# Patient Record
Sex: MALE | Race: OTHER | URBAN - METROPOLITAN AREA
[De-identification: names, ages, dates, MRNs, and addresses within clinical notes are randomized per-mention and may not be internally consistent; named-entity substitution may affect disease eponyms.]

---

## 2018-09-08 ENCOUNTER — EMERGENCY (EMERGENCY)
Facility: HOSPITAL | Age: 23
LOS: 0 days | Discharge: HOME | End: 2018-09-08
Attending: STUDENT IN AN ORGANIZED HEALTH CARE EDUCATION/TRAINING PROGRAM
Payer: COMMERCIAL

## 2018-09-08 VITALS
DIASTOLIC BLOOD PRESSURE: 83 MMHG | WEIGHT: 199.96 LBS | SYSTOLIC BLOOD PRESSURE: 149 MMHG | TEMPERATURE: 98 F | OXYGEN SATURATION: 100 % | RESPIRATION RATE: 16 BRPM | HEART RATE: 93 BPM

## 2018-09-08 DIAGNOSIS — S61.214A LACERATION WITHOUT FOREIGN BODY OF RIGHT RING FINGER WITHOUT DAMAGE TO NAIL, INITIAL ENCOUNTER: ICD-10-CM
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
## 2018-09-08 PROCEDURE — 99282 EMERGENCY DEPT VISIT SF MDM: CPT

## 2018-09-08 RX ORDER — TETANUS TOXOID, REDUCED DIPHTHERIA TOXOID AND ACELLULAR PERTUSSIS VACCINE, ADSORBED 5; 2.5; 8; 8; 2.5 [IU]/.5ML; [IU]/.5ML; UG/.5ML; UG/.5ML; UG/.5ML
0.5 SUSPENSION INTRAMUSCULAR ONCE
Qty: 0 | Refills: 0 | Status: COMPLETED | OUTPATIENT
Start: 2018-09-08 | End: 2018-09-08

## 2018-09-08 RX ADMIN — TETANUS TOXOID, REDUCED DIPHTHERIA TOXOID AND ACELLULAR PERTUSSIS VACCINE, ADSORBED 0.5 MILLILITER(S): 5; 2.5; 8; 8; 2.5 SUSPENSION INTRAMUSCULAR at 12:27

## 2018-09-08 NOTE — ED ADULT TRIAGE NOTE - CHIEF COMPLAINT QUOTE
partial avulsion 4th finger tip rt hand partial avulsion 4th finger tip rt hand .9ns dsg applied + bleeding

## 2018-09-08 NOTE — ED ADULT NURSE NOTE - NSIMPLEMENTINTERV_GEN_ALL_ED
Implemented All Universal Safety Interventions:  [place] to call system. Call bell, personal items and telephone within reach. Instruct patient to call for assistance. Room bathroom lighting operational. Non-slip footwear when patient is off stretcher. Physically safe environment: no spills, clutter or unnecessary equipment. Stretcher in lowest position, wheels locked, appropriate side rails in place.

## 2018-09-08 NOTE — ED PROVIDER NOTE - MEDICAL DECISION MAKING DETAILS
pt's wound was cleaned, surgicel applied to the area which was very small and covered with a 2x2 and wrapped

## 2018-09-08 NOTE — ED PROVIDER NOTE - OBJECTIVE STATEMENT
23 year old male presents today c/o cutting the tip of his right fourth digit with a razor blade today, last tetanus unknown, (-) paresthesia +normal rom of the finger , cap refill <2 sec

## 2018-09-08 NOTE — ED ADULT NURSE NOTE - OBJECTIVE STATEMENT
received pt to  FT area. states he was cutting something with a razor blade and accidentally cut his finger. partial avulsion 4th finger tip rt hand .9ns dsg applied + bleeding

## 2018-11-08 ENCOUNTER — EMERGENCY (EMERGENCY)
Facility: HOSPITAL | Age: 23
LOS: 1 days | Discharge: HOME | End: 2018-11-08
Attending: EMERGENCY MEDICINE
Payer: COMMERCIAL

## 2018-11-08 VITALS
TEMPERATURE: 99 F | HEART RATE: 83 BPM | DIASTOLIC BLOOD PRESSURE: 81 MMHG | SYSTOLIC BLOOD PRESSURE: 148 MMHG | OXYGEN SATURATION: 99 % | RESPIRATION RATE: 16 BRPM | HEIGHT: 72 IN | WEIGHT: 210.1 LBS

## 2018-11-08 PROCEDURE — 99283 EMERGENCY DEPT VISIT LOW MDM: CPT | Mod: 25

## 2018-11-08 PROCEDURE — 12032 INTMD RPR S/A/T/EXT 2.6-7.5: CPT

## 2018-11-08 PROCEDURE — 73090 X-RAY EXAM OF FOREARM: CPT

## 2018-11-08 PROCEDURE — 73090 X-RAY EXAM OF FOREARM: CPT | Mod: 26,LT

## 2018-11-08 RX ORDER — ACETAMINOPHEN 500 MG
975 TABLET ORAL ONCE
Qty: 0 | Refills: 0 | Status: COMPLETED | OUTPATIENT
Start: 2018-11-08 | End: 2018-11-08

## 2018-11-08 RX ADMIN — Medication 975 MILLIGRAM(S): at 09:01

## 2018-11-08 NOTE — ED PROVIDER NOTE - OBJECTIVE STATEMENT
23y M no signif PMH here with ~6cm laceration to volar forearm. Pt states was pushing up a glass window and elbow went thru window. Occurred ~1-2 hours ago. On arrival pain 7/10. Has not taken anything for pain. No active bleeding. No weakness, numbness. Last Tdap 9/8/18. Denies SI/HI.

## 2018-11-08 NOTE — ED ADULT TRIAGE NOTE - CHIEF COMPLAINT QUOTE
"I was trying to open a window that was stuck. My arm went right through it"  Laceration to right forearm, pt reports deep lac. Abrasions to right 2nd & 3rd digits. Pt reports last TDAP while oversees ~6 years ago

## 2018-11-08 NOTE — ED PROVIDER NOTE - MEDICAL DECISION MAKING DETAILS
23y M no signif PMH here with ~6cm laceration to volar forearm and couple additional smaller lacs. Tdap UTD. Perip NV intact. Will 23y M no signif PMH here with ~6cm laceration to volar forearm and couple additional smaller lacs. Tdap UTD. Perip NV intact. Will irrigate, remove glass, Xray and repair.

## 2018-11-08 NOTE — ED PROCEDURE NOTE - PROCEDURE ADDITIONAL DETAILS
4 lacerations to right forearm - the largest measuring 6cm. The others ranging 1.5-2cm. Total of 24 sutures placed, with application of dermabond in one superficial laceration and steristrip placement in additional superficial wound.

## 2018-11-08 NOTE — ED PROVIDER NOTE - PHYSICAL EXAMINATION
Gen: WNWD NAD  HEENT: NCAT   CV: RRR, no murmur  Lung: CTA BL  Ext: TTP R volar forearm, palp distal pulses  Skin: ~6cm linear lac, ~2cm linear lac, ~1.5cm linear lac, superficial abrasions.   Neuro: CN grossly intact, sensation intact, motor 5/5 throughout

## 2018-11-08 NOTE — ED ADULT NURSE NOTE - OBJECTIVE STATEMENT
Pt  here from home reports attempting to open window and it broke causing laceration to right arm, pt is up to date on his tetanus which was given last visit. Pt reports 9/10 arm pain, MD at bedside and order for Tylenol to be ordered. Pt to get x-ray, and is pleasant and agreeable with plan.
